# Patient Record
Sex: MALE | Race: WHITE | NOT HISPANIC OR LATINO | Employment: FULL TIME | URBAN - METROPOLITAN AREA
[De-identification: names, ages, dates, MRNs, and addresses within clinical notes are randomized per-mention and may not be internally consistent; named-entity substitution may affect disease eponyms.]

---

## 2018-09-09 ENCOUNTER — HOSPITAL ENCOUNTER (OUTPATIENT)
Facility: HOSPITAL | Age: 33
Discharge: LEFT BEFORE DISCHARGE | End: 2018-09-10
Attending: EMERGENCY MEDICINE | Admitting: HOSPITALIST

## 2018-09-09 DIAGNOSIS — F11.10 OPIATE ABUSE, EPISODIC: Primary | ICD-10-CM

## 2018-09-09 DIAGNOSIS — K52.9 COLITIS: ICD-10-CM

## 2018-09-09 PROBLEM — F19.10 POLYSUBSTANCE ABUSE: Status: ACTIVE | Noted: 2018-09-09

## 2018-09-09 PROBLEM — R10.9 ABDOMINAL PAIN: Status: ACTIVE | Noted: 2018-09-09

## 2018-09-09 LAB
ALBUMIN SERPL BCP-MCNC: 3.8 G/DL
ALP SERPL-CCNC: 50 U/L
ALT SERPL W/O P-5'-P-CCNC: 19 U/L
ANION GAP SERPL CALC-SCNC: 7 MMOL/L
AST SERPL-CCNC: 27 U/L
BASOPHILS # BLD AUTO: 0.02 K/UL
BASOPHILS NFR BLD: 0.1 %
BILIRUB SERPL-MCNC: 1 MG/DL
BILIRUB UR QL STRIP: NEGATIVE
BUN SERPL-MCNC: 15 MG/DL
CALCIUM SERPL-MCNC: 8.7 MG/DL
CHLORIDE SERPL-SCNC: 109 MMOL/L
CLARITY UR REFRACT.AUTO: CLEAR
CO2 SERPL-SCNC: 22 MMOL/L
COLOR UR AUTO: YELLOW
CREAT SERPL-MCNC: 0.8 MG/DL
CRP SERPL-MCNC: 0.5 MG/L
DIFFERENTIAL METHOD: ABNORMAL
EOSINOPHIL # BLD AUTO: 0.1 K/UL
EOSINOPHIL NFR BLD: 0.7 %
ERYTHROCYTE [DISTWIDTH] IN BLOOD BY AUTOMATED COUNT: 12.6 %
ERYTHROCYTE [SEDIMENTATION RATE] IN BLOOD BY WESTERGREN METHOD: <2 MM/HR
EST. GFR  (AFRICAN AMERICAN): >60 ML/MIN/1.73 M^2
EST. GFR  (NON AFRICAN AMERICAN): >60 ML/MIN/1.73 M^2
GLUCOSE SERPL-MCNC: 90 MG/DL
GLUCOSE UR QL STRIP: NEGATIVE
HCT VFR BLD AUTO: 39.3 %
HGB BLD-MCNC: 13.8 G/DL
HGB UR QL STRIP: NEGATIVE
IMM GRANULOCYTES # BLD AUTO: 0.04 K/UL
IMM GRANULOCYTES NFR BLD AUTO: 0.3 %
KETONES UR QL STRIP: ABNORMAL
LACTATE SERPL-SCNC: 1 MMOL/L
LEUKOCYTE ESTERASE UR QL STRIP: NEGATIVE
LYMPHOCYTES # BLD AUTO: 2.1 K/UL
LYMPHOCYTES NFR BLD: 15.1 %
MCH RBC QN AUTO: 32.3 PG
MCHC RBC AUTO-ENTMCNC: 35.1 G/DL
MCV RBC AUTO: 92 FL
MONOCYTES # BLD AUTO: 0.8 K/UL
MONOCYTES NFR BLD: 5.6 %
NEUTROPHILS # BLD AUTO: 10.6 K/UL
NEUTROPHILS NFR BLD: 78.2 %
NITRITE UR QL STRIP: NEGATIVE
NRBC BLD-RTO: 0 /100 WBC
PH UR STRIP: 7 [PH] (ref 5–8)
PLATELET # BLD AUTO: 240 K/UL
PMV BLD AUTO: 10.3 FL
POTASSIUM SERPL-SCNC: 3.7 MMOL/L
PROT SERPL-MCNC: 6.4 G/DL
PROT UR QL STRIP: NEGATIVE
RBC # BLD AUTO: 4.27 M/UL
SODIUM SERPL-SCNC: 138 MMOL/L
SP GR UR STRIP: 1.01 (ref 1–1.03)
URN SPEC COLLECT METH UR: ABNORMAL
UROBILINOGEN UR STRIP-ACNC: 2 EU/DL
WBC # BLD AUTO: 13.54 K/UL

## 2018-09-09 PROCEDURE — 85652 RBC SED RATE AUTOMATED: CPT

## 2018-09-09 PROCEDURE — 96365 THER/PROPH/DIAG IV INF INIT: CPT

## 2018-09-09 PROCEDURE — 25000003 PHARM REV CODE 250: Performed by: EMERGENCY MEDICINE

## 2018-09-09 PROCEDURE — 85025 COMPLETE CBC W/AUTO DIFF WBC: CPT | Mod: 91

## 2018-09-09 PROCEDURE — 99285 EMERGENCY DEPT VISIT HI MDM: CPT | Mod: 25

## 2018-09-09 PROCEDURE — G0378 HOSPITAL OBSERVATION PER HR: HCPCS

## 2018-09-09 PROCEDURE — 83605 ASSAY OF LACTIC ACID: CPT

## 2018-09-09 PROCEDURE — 81003 URINALYSIS AUTO W/O SCOPE: CPT

## 2018-09-09 PROCEDURE — S0030 INJECTION, METRONIDAZOLE: HCPCS | Performed by: EMERGENCY MEDICINE

## 2018-09-09 PROCEDURE — 80053 COMPREHEN METABOLIC PANEL: CPT | Mod: 91

## 2018-09-09 PROCEDURE — 87040 BLOOD CULTURE FOR BACTERIA: CPT | Mod: 59

## 2018-09-09 PROCEDURE — 63600175 PHARM REV CODE 636 W HCPCS: Performed by: EMERGENCY MEDICINE

## 2018-09-09 PROCEDURE — 96375 TX/PRO/DX INJ NEW DRUG ADDON: CPT

## 2018-09-09 PROCEDURE — 99284 EMERGENCY DEPT VISIT MOD MDM: CPT | Mod: ,,, | Performed by: EMERGENCY MEDICINE

## 2018-09-09 PROCEDURE — 96372 THER/PROPH/DIAG INJ SC/IM: CPT

## 2018-09-09 PROCEDURE — 63600175 PHARM REV CODE 636 W HCPCS: Performed by: HOSPITALIST

## 2018-09-09 PROCEDURE — 99219 PR INITIAL OBSERVATION CARE,LEVL II: CPT | Mod: ,,, | Performed by: HOSPITALIST

## 2018-09-09 PROCEDURE — 86140 C-REACTIVE PROTEIN: CPT

## 2018-09-09 PROCEDURE — 25000003 PHARM REV CODE 250: Performed by: HOSPITALIST

## 2018-09-09 PROCEDURE — 96361 HYDRATE IV INFUSION ADD-ON: CPT

## 2018-09-09 RX ORDER — IBUPROFEN 200 MG
16 TABLET ORAL
Status: DISCONTINUED | OUTPATIENT
Start: 2018-09-09 | End: 2018-09-10 | Stop reason: HOSPADM

## 2018-09-09 RX ORDER — IBUPROFEN 200 MG
24 TABLET ORAL
Status: DISCONTINUED | OUTPATIENT
Start: 2018-09-09 | End: 2018-09-10 | Stop reason: HOSPADM

## 2018-09-09 RX ORDER — KETOROLAC TROMETHAMINE 30 MG/ML
10 INJECTION, SOLUTION INTRAMUSCULAR; INTRAVENOUS
Status: COMPLETED | OUTPATIENT
Start: 2018-09-09 | End: 2018-09-09

## 2018-09-09 RX ORDER — GLUCAGON 1 MG
1 KIT INJECTION
Status: DISCONTINUED | OUTPATIENT
Start: 2018-09-09 | End: 2018-09-10 | Stop reason: HOSPADM

## 2018-09-09 RX ORDER — CIPROFLOXACIN 500 MG/1
500 TABLET ORAL EVERY 12 HOURS
Status: DISCONTINUED | OUTPATIENT
Start: 2018-09-10 | End: 2018-09-09

## 2018-09-09 RX ORDER — ONDANSETRON 8 MG/1
8 TABLET, ORALLY DISINTEGRATING ORAL EVERY 8 HOURS PRN
Status: DISCONTINUED | OUTPATIENT
Start: 2018-09-09 | End: 2018-09-10 | Stop reason: HOSPADM

## 2018-09-09 RX ORDER — SODIUM CHLORIDE 0.9 % (FLUSH) 0.9 %
5 SYRINGE (ML) INJECTION
Status: DISCONTINUED | OUTPATIENT
Start: 2018-09-09 | End: 2018-09-10 | Stop reason: HOSPADM

## 2018-09-09 RX ORDER — CIPROFLOXACIN 2 MG/ML
400 INJECTION, SOLUTION INTRAVENOUS
Status: DISCONTINUED | OUTPATIENT
Start: 2018-09-09 | End: 2018-09-10 | Stop reason: HOSPADM

## 2018-09-09 RX ORDER — PROCHLORPERAZINE EDISYLATE 5 MG/ML
10 INJECTION INTRAMUSCULAR; INTRAVENOUS
Status: COMPLETED | OUTPATIENT
Start: 2018-09-09 | End: 2018-09-09

## 2018-09-09 RX ORDER — AMOXICILLIN 250 MG
1 CAPSULE ORAL 2 TIMES DAILY PRN
Status: DISCONTINUED | OUTPATIENT
Start: 2018-09-09 | End: 2018-09-10 | Stop reason: HOSPADM

## 2018-09-09 RX ORDER — RAMELTEON 8 MG/1
8 TABLET ORAL NIGHTLY PRN
Status: DISCONTINUED | OUTPATIENT
Start: 2018-09-09 | End: 2018-09-10 | Stop reason: HOSPADM

## 2018-09-09 RX ORDER — HYOSCYAMINE SULFATE 0.12 MG/1
0.12 TABLET SUBLINGUAL 3 TIMES DAILY
Status: DISCONTINUED | OUTPATIENT
Start: 2018-09-09 | End: 2018-09-10 | Stop reason: HOSPADM

## 2018-09-09 RX ORDER — METRONIDAZOLE 500 MG/100ML
500 INJECTION, SOLUTION INTRAVENOUS
Status: DISCONTINUED | OUTPATIENT
Start: 2018-09-10 | End: 2018-09-10 | Stop reason: HOSPADM

## 2018-09-09 RX ORDER — DICYCLOMINE HYDROCHLORIDE 10 MG/ML
20 INJECTION INTRAMUSCULAR
Status: COMPLETED | OUTPATIENT
Start: 2018-09-09 | End: 2018-09-09

## 2018-09-09 RX ORDER — SODIUM CHLORIDE 9 MG/ML
INJECTION, SOLUTION INTRAVENOUS CONTINUOUS
Status: ACTIVE | OUTPATIENT
Start: 2018-09-09 | End: 2018-09-10

## 2018-09-09 RX ORDER — ACETAMINOPHEN 325 MG/1
650 TABLET ORAL EVERY 8 HOURS PRN
Status: DISCONTINUED | OUTPATIENT
Start: 2018-09-09 | End: 2018-09-10 | Stop reason: HOSPADM

## 2018-09-09 RX ORDER — METRONIDAZOLE 500 MG/100ML
500 INJECTION, SOLUTION INTRAVENOUS
Status: COMPLETED | OUTPATIENT
Start: 2018-09-09 | End: 2018-09-09

## 2018-09-09 RX ADMIN — HYOSCYAMINE SULFATE 0.12 MG: 0.12 TABLET ORAL; SUBLINGUAL at 05:09

## 2018-09-09 RX ADMIN — CIPROFLOXACIN 400 MG: 2 INJECTION, SOLUTION INTRAVENOUS at 06:09

## 2018-09-09 RX ADMIN — ACETAMINOPHEN 650 MG: 325 TABLET, FILM COATED ORAL at 08:09

## 2018-09-09 RX ADMIN — SODIUM CHLORIDE 1000 ML: 0.9 INJECTION, SOLUTION INTRAVENOUS at 06:09

## 2018-09-09 RX ADMIN — KETOROLAC TROMETHAMINE 10 MG: 30 INJECTION, SOLUTION INTRAMUSCULAR at 04:09

## 2018-09-09 RX ADMIN — PROCHLORPERAZINE EDISYLATE 10 MG: 5 INJECTION INTRAMUSCULAR; INTRAVENOUS at 04:09

## 2018-09-09 RX ADMIN — DICYCLOMINE HYDROCHLORIDE 20 MG: 20 INJECTION, SOLUTION INTRAMUSCULAR at 04:09

## 2018-09-09 RX ADMIN — SODIUM CHLORIDE 1000 ML: 0.9 INJECTION, SOLUTION INTRAVENOUS at 05:09

## 2018-09-09 RX ADMIN — HYOSCYAMINE SULFATE 0.12 MG: 0.12 TABLET ORAL; SUBLINGUAL at 08:09

## 2018-09-09 RX ADMIN — METRONIDAZOLE 500 MG: 500 INJECTION, SOLUTION INTRAVENOUS at 05:09

## 2018-09-09 NOTE — ED NOTES
LOC: The patient is awake, alert, and oriented to place, time, situation.  Speech is clear.    APPEARANCE: Patient resting on exam table, pt appears very uncomfortably, in  acute distress.  Patient is clean and well groomed.    SKIN: The skin is warm and dry; color consistent with ethnicity.  Patient has normal skin turgor and dry  mucus membranes.  Skin intact; no breakdown or bruising noted. Tattooed.    MUSCULOSKELETAL: Patient moving upper and lower extremities without difficulty.  Denies weakness.     RESPIRATORY: Airway is open and patent. Respirations spontaneous, even, easy, and non-labored.  Patient has a normal effort and rate.  No accessory muscle use noted. Denies cough.     CARDIAC:No peripheral edema noted. No complaints of chest pain.      ABDOMEN: firm and  tender to palpation. Pt reporting he feels like he has razor blades in his stomach. Last BM this am.    NEUROLOGIC: Eyes open spontaneously.  Follows commands; facial expression symmetrical.  Purposeful motor response noted; normal sensation in all extremities.

## 2018-09-09 NOTE — H&P
"[facility] Hospital Medicine  History & Physical      Patient Name: Delano Santana  MRN:  [id number]  Date of Admission:  9/9/2018  Attending Physician:  Marge Waldron MD  Primary Care Physician: [facility] Medicine Team: [facility] HOSP MED E Shawnee Escobar MD    Patient information was obtained from patient and ER records.    Subjective:     Principle Problem:  Colitis    Chief Complaint:    Chief Complaint   Patient presents with    Abdominal Pain     was seen at [place] this morning and was told to come to [facility] main for a GI consult; n/v       HPI:  Mr. Delano Santana is a 33 y.o. male with polysubstance abuse who presents to the ED from [place] ED because he was told that he needed GI evaluation for his abdominal pain.  He mentions that for the last 2-3 days, he has been having crampy and "razor blade" like RLQ abdominal pain.  He went to the ED on 9/7 where CT scan was performed which was negative for acute process.  He was discharged home with Bentyl and Phenergan.  He returned to the ED in [place] today because his pain persisted.  He endorsed not being able to take any PO medications because of nausea, vomiting, and abdominal pain.  CT scan was again performed which showed new colitis and a WBC 14.  He was given Cipro/Flagyl and was was sent to [facility] for GI evaluation.  He denies any use of if any drugs, except that his mother gave him a pain medication to help his pain.  He denies any diarrhea, constipation, fever, or chills.    In the ED, he was given IVF and Cipro and Flagyl.  He was informed that he won't be receiving any narcotics while inpatient.  He was admitted to Hospital Medicine for further management.      Past Medical History:   Diagnosis Date    Colitis     GI bleed        History reviewed. No pertinent surgical history.    No family history on file.    Social History     Socioeconomic History    Marital status: Single     Spouse name: None "    Number of children: None    Years of education: None    Highest education level: None   Social Needs    Financial resource strain: None    Food insecurity - worry: None    Food insecurity - inability: None    Transportation needs - medical: None    Transportation needs - non-medical: None   Occupational History    None   Tobacco Use    Smoking status: Heavy Tobacco Smoker     Packs/day: 0.50     Types: Cigarettes    Smokeless tobacco: Never Used   Substance and Sexual Activity    Alcohol use: No    Drug use: Yes     Types: Marijuana    Sexual activity: None   Other Topics Concern    None   Social History Narrative    None       Current Facility-Administered Medications on File Prior to Encounter   Medication Dose Route Frequency Provider Last Rate Last Dose    [COMPLETED] 0.9%  NaCl infusion  1,000 mL Intravenous ED 1 Time Anthony Duffy  mL/hr at 09/09/18 0557 1,000 mL at 09/09/18 0557    [COMPLETED] ciprofloxacin HCl tablet 500 mg  500 mg Oral ED 1 Time Ja Amador MD   500 mg at 09/09/18 0724    [COMPLETED] dicyclomine injection 20 mg  20 mg Intramuscular ED 1 Time Anthony Duffy MD   20 mg at 09/09/18 0557    [COMPLETED] GI cocktail (mylanta 30 mL, lidocaine 2 % viscous 10 mL, dicyclomine 10 mL) 50 mL   Oral ED 1 Time Ja Amador MD   50 mL at 09/09/18 0701    [COMPLETED] hydromorphone (PF) injection 1 mg  1 mg Intravenous ED 1 Time Ja Amador MD   1 mg at 09/09/18 0722    [COMPLETED] metronidazole IVPB 500 mg  500 mg Intravenous ED 1 Time Anthony Duffy MD   Stopped at 09/09/18 0701    [COMPLETED] prochlorperazine injection Soln 10 mg  10 mg Intravenous Once Ja Amador MD   10 mg at 09/09/18 0720    [DISCONTINUED] metroNIDAZOLE tablet 500 mg  500 mg Oral ED 1 Time Ja Amador MD        [DISCONTINUED] prochlorperazine injection Soln 10 mg  10 mg Intravenous Once Ja Amador MD         Current Outpatient Medications on  File Prior to Encounter   Medication Sig Dispense Refill    dicyclomine (BENTYL) 20 mg tablet Take 1 tablet (20 mg total) by mouth 3 (three) times daily as needed (abdominal cramps). 20 tablet 0    metroNIDAZOLE (FLAGYL) 500 MG tablet Take 1 tablet (500 mg total) by mouth 3 (three) times daily. for 7 days 21 tablet 0    promethazine (PHENERGAN) 25 MG tablet Take 1 tablet (25 mg total) by mouth every 6 (six) hours as needed for Nausea. 15 tablet 0    ciprofloxacin HCl (CIPRO) 500 MG tablet Take 1 tablet (500 mg total) by mouth 2 (two) times daily. for 7 days 14 tablet 0    UNKNOWN TO PATIENT       [DISCONTINUED] dicyclomine (BENTYL) 20 mg tablet Take 1 tablet (20 mg total) by mouth 2 (two) times daily. 20 tablet 0        Review of patient's allergies indicates:  No Known Allergies    Review Of Systems:  Constitutional: Negative for chills, fatigue, fever.   HENT: Negative for sore throat, trouble swallowing.    Eyes: Negative for photophobia, visual disturbance.   Respiratory: Negative for cough, shortness of breath.    Cardiovascular: Negative for chest pain, palpitations, leg swelling.   Gastrointestinal: Negative for abdominal pain, constipation, diarrhea, nausea, vomiting.   Endocrine: Negative for cold intolerance, heat intolerance.   Genitourinary: Negative for dysuria, frequency.   Musculoskeletal: Negative for arthralgias, myalgias.   Skin: Negative for rash, wound, erythema   Neurological: Negative for dizziness, syncope, weakness, light-headedness.   Psychiatric/Behavioral: Negative for confusion, hallucinations, anxiety  All other systems reviewed and are negative.      Objective:     Temp:  [97.7 °F (36.5 °C)-98.3 °F (36.8 °C)]   Pulse:  [47-76]   Resp:  [18-20]   BP: (116-144)/(79-97)   SpO2:  [98 %-99 %]   Body mass index is 22.71 kg/m².    Physical Exam:  Constitutional: Appears well-developed and well-nourished. Appears slightly uncomfortable.  Head: Normocephalic and atraumatic.   Mouth/Throat:  Oropharynx is clear and moist.   Eyes: EOM are normal. Pupils are equal, round, and reactive to light. No scleral icterus.   Neck: Normal range of motion. Neck supple.   Cardiovascular: Normal heart rate.  Regular heart rhythm.  No murmur heard.  Pulmonary/Chest: Effort normal and breath sounds normal. No respiratory distress. No wheezes, rales, or rhonchi  Abdominal: Soft. Bowel sounds are normal.  No distension.  No tenderness.  Voluntary guarding  Musculoskeletal: Normal range of motion. No edema.   Neurological: Alert and oriented to person, place, and time.   Skin: Skin is warm and dry. Multiple tattoos  Psychiatric: Normal mood and affect. Behavior is normal.     No intake or output data in the 24 hours ending 09/09/18 1718  Lab Results   Component Value Date    WBC 13.54 (H) 09/09/2018    HGB 13.8 (L) 09/09/2018    HCT 39.3 (L) 09/09/2018    MCV 92 09/09/2018     09/09/2018     Recent Labs   Lab  09/09/18   1648   GLU  90   NA  138   K  3.7   CL  109   CO2  22*   BUN  15   CREATININE  0.8   CALCIUM  8.7     No results found for: INR, PROTIME  No results found for: HGBA1C  No results for input(s): POCTGLUCOSE in the last 72 hours.  No results for input(s): LACTATE in the last 72 hours.       Assessment/Plan:     Mr. Delano Santana is a 33 y.o. male who presented to [facility] on 9/9/2018 with colitis.    Colitis  Abdominal Pain  · WBC 14 with a left shift  · CT scan 9/9 with colitis  · Start IV Cipro/Flagyl (start date 9/9)  · Continue IVF  · Levsin TID for abdominal pain and Zofran prn nausea/vomiting    Polysubstance Abuse  Opiate Abuse  · UDS positive for THC, Oxy  · Continue IVF and Levsin for pain  · Informed patient he won't be receiving narcotics while inpatient       Diet:  Clear liquid  GI PPx:  Not indicated  DVT PPx:  Ambulatory  Goals of Care:  Full      Disposition:  Home [place] pending toleration of diet and abdominal pain  Discharge Needs:  TBD      Shawnee Escobar MD  [facility]  Medicine  Cell:  [phone]  Spectra:  [phone]  Pager:  [phone]

## 2018-09-09 NOTE — ED TRIAGE NOTES
Comes to ED, EMS transfer fron [facility] for c/o severe abdominal pain.  Pain is described as razor blades, constant, since Friday.

## 2018-09-09 NOTE — ED PROVIDER NOTES
"Encounter Date: 9/9/2018    SCRIBE #1 NOTE: I, Jeanna De Leon, am scribing for, and in the presence of,  Dr. Wilkinson. I have scribed the following portions of the note - Other sections scribed: HPI, ROS, PE.       History     Chief Complaint   Patient presents with    Abdominal Pain     was seen at [place] this morning and was told to come to [facility] for a GI consult; n/v     Time patient was seen by the provider: 4:04 PM      The patient is a 33 y.o. male with medical history of GI bleed, colitis and positive for opioid use in the past who presents to the ED with a complaint of severe abdominal pain with onset 2 days ago. Patient was seen at [place] this morning and transferred here per EMS for a GI consult. He describes pain as, "razor blades." He reports he smokes tobacca and maijuana socially. Patient evasive regarding opioid use when confronted. He states his mother gave him pills for pain.       The history is provided by the patient and medical records.     Review of patient's allergies indicates:  No Known Allergies  Past Medical History:   Diagnosis Date    Colitis     GI bleed      History reviewed. No pertinent surgical history.  No family history on file.  Social History     Tobacco Use    Smoking status: Heavy Tobacco Smoker     Packs/day: 0.50     Types: Cigarettes    Smokeless tobacco: Never Used   Substance Use Topics    Alcohol use: No    Drug use: Yes     Types: Marijuana     Review of Systems   Gastrointestinal: Positive for abdominal pain.   All other systems reviewed and are negative.      Physical Exam     Initial Vitals [09/09/18 1534]   BP Pulse Resp Temp SpO2   116/79 60 18 97.7 °F (36.5 °C) 99 %      MAP       --         Physical Exam    Nursing note and vitals reviewed.  Constitutional: He appears well-developed and well-nourished.   Uncomfortable   HENT:   Head: Normocephalic and atraumatic.   Mouth/Throat: Oropharynx is clear and moist and mucous membranes are normal. "   Eyes: Conjunctivae are normal.   Neck: Normal range of motion. Neck supple.   Cardiovascular: Normal rate, regular rhythm and normal heart sounds.   Pulmonary/Chest: Breath sounds normal. No respiratory distress.   Abdominal: Soft. Bowel sounds are normal. He exhibits no distension. There is no tenderness. There is guarding (voluntary).   Musculoskeletal: Normal range of motion. He exhibits no edema.   Neurological: He is alert and oriented to person, place, and time. He has normal strength. No sensory deficit.   Skin: Skin is warm and dry. Capillary refill takes less than 2 seconds.   Psychiatric: He has a normal mood and affect.         ED Course   Procedures  Labs Reviewed - No data to display       Imaging Results    None          Medical Decision Making:   History:   Old Medical Records: I decided to obtain old medical records.  ED Management:  Will place in observation. Concern with evasive hx re opiates in UDS. ? Withdrawal, ? Worsening colitis. Will obs and offer ivf and abx.            Scribe Attestation:   Scribe #1: I performed the above scribed service and the documentation accurately describes the services I performed. I attest to the accuracy of the note.               Clinical Impression:   There were no encounter diagnoses.                             Nura Wilkinson MD  09/09/18 9934

## 2018-09-10 VITALS
OXYGEN SATURATION: 100 % | DIASTOLIC BLOOD PRESSURE: 77 MMHG | WEIGHT: 142 LBS | SYSTOLIC BLOOD PRESSURE: 126 MMHG | TEMPERATURE: 97 F | BODY MASS INDEX: 22.24 KG/M2 | HEART RATE: 53 BPM | RESPIRATION RATE: 16 BRPM

## 2018-09-10 LAB
ALBUMIN SERPL BCP-MCNC: 3.8 G/DL
ALP SERPL-CCNC: 52 U/L
ALT SERPL W/O P-5'-P-CCNC: 21 U/L
ANION GAP SERPL CALC-SCNC: 9 MMOL/L
AST SERPL-CCNC: 36 U/L
BASOPHILS # BLD AUTO: 0.05 K/UL
BASOPHILS NFR BLD: 0.4 %
BILIRUB SERPL-MCNC: 1.2 MG/DL
BUN SERPL-MCNC: 11 MG/DL
CALCIUM SERPL-MCNC: 8.5 MG/DL
CHLORIDE SERPL-SCNC: 108 MMOL/L
CO2 SERPL-SCNC: 20 MMOL/L
CREAT SERPL-MCNC: 0.8 MG/DL
DIFFERENTIAL METHOD: ABNORMAL
EOSINOPHIL # BLD AUTO: 0.1 K/UL
EOSINOPHIL NFR BLD: 1.1 %
ERYTHROCYTE [DISTWIDTH] IN BLOOD BY AUTOMATED COUNT: 12.6 %
EST. GFR  (AFRICAN AMERICAN): >60 ML/MIN/1.73 M^2
EST. GFR  (NON AFRICAN AMERICAN): >60 ML/MIN/1.73 M^2
GLUCOSE SERPL-MCNC: 100 MG/DL
HCT VFR BLD AUTO: 39.6 %
HGB BLD-MCNC: 13.5 G/DL
IMM GRANULOCYTES # BLD AUTO: 0.05 K/UL
IMM GRANULOCYTES NFR BLD AUTO: 0.4 %
LYMPHOCYTES # BLD AUTO: 2.1 K/UL
LYMPHOCYTES NFR BLD: 17.3 %
MAGNESIUM SERPL-MCNC: 1.7 MG/DL
MCH RBC QN AUTO: 31.8 PG
MCHC RBC AUTO-ENTMCNC: 34.1 G/DL
MCV RBC AUTO: 93 FL
MONOCYTES # BLD AUTO: 0.8 K/UL
MONOCYTES NFR BLD: 6.1 %
NEUTROPHILS # BLD AUTO: 9.1 K/UL
NEUTROPHILS NFR BLD: 74.7 %
NRBC BLD-RTO: 0 /100 WBC
PHOSPHATE SERPL-MCNC: 2.3 MG/DL
PLATELET # BLD AUTO: 233 K/UL
PMV BLD AUTO: 10.3 FL
POTASSIUM SERPL-SCNC: 3.7 MMOL/L
PROT SERPL-MCNC: 6 G/DL
RBC # BLD AUTO: 4.24 M/UL
SODIUM SERPL-SCNC: 137 MMOL/L
WBC # BLD AUTO: 12.2 K/UL

## 2018-09-10 PROCEDURE — 83735 ASSAY OF MAGNESIUM: CPT

## 2018-09-10 PROCEDURE — 85025 COMPLETE CBC W/AUTO DIFF WBC: CPT

## 2018-09-10 PROCEDURE — 25000003 PHARM REV CODE 250: Performed by: HOSPITALIST

## 2018-09-10 PROCEDURE — S0030 INJECTION, METRONIDAZOLE: HCPCS | Performed by: HOSPITALIST

## 2018-09-10 PROCEDURE — G0378 HOSPITAL OBSERVATION PER HR: HCPCS

## 2018-09-10 PROCEDURE — 84100 ASSAY OF PHOSPHORUS: CPT

## 2018-09-10 PROCEDURE — 63600175 PHARM REV CODE 636 W HCPCS: Performed by: NURSE PRACTITIONER

## 2018-09-10 PROCEDURE — 36415 COLL VENOUS BLD VENIPUNCTURE: CPT

## 2018-09-10 PROCEDURE — 80053 COMPREHEN METABOLIC PANEL: CPT

## 2018-09-10 PROCEDURE — 63600175 PHARM REV CODE 636 W HCPCS: Performed by: EMERGENCY MEDICINE

## 2018-09-10 PROCEDURE — 99226 PR SUBSEQUENT OBSERVATION CARE,LEVEL III: CPT | Mod: ,,, | Performed by: PHYSICIAN ASSISTANT

## 2018-09-10 RX ORDER — KETOROLAC TROMETHAMINE 15 MG/ML
10 INJECTION, SOLUTION INTRAMUSCULAR; INTRAVENOUS ONCE
Status: COMPLETED | OUTPATIENT
Start: 2018-09-10 | End: 2018-09-10

## 2018-09-10 RX ORDER — ACETAMINOPHEN 10 MG/ML
1000 INJECTION, SOLUTION INTRAVENOUS ONCE
Status: COMPLETED | OUTPATIENT
Start: 2018-09-10 | End: 2018-09-10

## 2018-09-10 RX ORDER — KETOROLAC TROMETHAMINE 15 MG/ML
15 INJECTION, SOLUTION INTRAMUSCULAR; INTRAVENOUS EVERY 6 HOURS PRN
Status: DISCONTINUED | OUTPATIENT
Start: 2018-09-10 | End: 2018-09-10 | Stop reason: HOSPADM

## 2018-09-10 RX ADMIN — ONDANSETRON 8 MG: 8 TABLET, ORALLY DISINTEGRATING ORAL at 02:09

## 2018-09-10 RX ADMIN — METRONIDAZOLE 500 MG: 500 INJECTION, SOLUTION INTRAVENOUS at 02:09

## 2018-09-10 RX ADMIN — CIPROFLOXACIN 400 MG: 2 INJECTION, SOLUTION INTRAVENOUS at 05:09

## 2018-09-10 RX ADMIN — KETOROLAC TROMETHAMINE 10.01 MG: 15 INJECTION, SOLUTION INTRAMUSCULAR; INTRAVENOUS at 05:09

## 2018-09-10 RX ADMIN — ACETAMINOPHEN 1000 MG: 10 INJECTION, SOLUTION INTRAVENOUS at 06:09

## 2018-09-10 NOTE — PROGRESS NOTES
Pt complaining of stabbing pain in abdomen. Tanika De Paz who ordered IV tylenol for pt. Pt is requesting Dilaudid and threatening to leave AMA if his pain is not controlled. Will continue with POC.

## 2018-09-10 NOTE — PROGRESS NOTES
Pt complaining of nausea and stabbing pain in abdomen. Spoke with YESSICA De Paz who ordered a one time dose of ketorolac.

## 2018-09-10 NOTE — DISCHARGE SUMMARY
"Discharge Summary  Hospital Medicine    Attending Provider on Discharge: Dio Ch PA-C  Hospital Medicine Team: [facility] HOSP MED E  Date of Admission:  9/9/2018     Date of Discharge:  9/10/2018    Active Hospital Problems    Diagnosis  POA    *Colitis [K52.9]  Yes    Polysubstance abuse [F19.10]  Yes    Opiate abuse, episodic [F11.10]  Yes    Abdominal pain [R10.9]  Yes      Resolved Hospital Problems   No resolved problems to display.       History of the Present Illness:       Mr. Delano Santana is a 33 y.o. male with polysubstance abuse who presents to the ED from [facility] because he was told that he needed GI evaluation for his abdominal pain.  He mentions that for the last 2-3 days, he has been having crampy and "razor blade" like RLQ abdominal pain.  He went to the ED on 9/7 where CT scan was performed which was negative for acute process.  He was discharged home with Bentyl and Phenergan.  He returned to the ED in [place] today because his pain persisted.  He endorsed not being able to take any PO medications because of nausea, vomiting, and abdominal pain.  CT scan was again performed which showed new colitis and a WBC 14.  He was given Cipro/Flagyl and was was sent to [facility] for GI evaluation.  He denies any use of if any drugs, except that his mother gave him a pain medication to help his pain.  He denies any diarrhea, constipation, fever, or chills.     In the ED, he was given IVF and Cipro and Flagyl.  He was informed that he won't be receiving any narcotics while inpatient.  He was admitted to Hospital Medicine for further management.      Hospital Course of Principle Problem Addressed:       Patient admitted to observation for colitis. Prior to primary team arrival, patient discharged AMA.     Other Medical Problems Addressed in the Hospital:     Mr. Delano Santana is a 33 y.o. male who presented to [facility] on 9/9/2018 with colitis.     Colitis  Abdominal Pain  · WBC 14 with a " left shift  · CT scan 9/9 with colitis  · Start IV Cipro/Flagyl (start date 9/9)  · Continue IVF  · Levsin TID for abdominal pain and Zofran prn nausea/vomiting  · Patient left AMA     Polysubstance Abuse  Opiate Abuse  · UDS positive for THC, Oxy  · Continue IVF and Levsin for pain  · Informed patient he won't be receiving narcotics while inpatient        Significant diagnostic tests     Recent Labs   Lab  09/07/18   0911  09/09/18   0552  09/09/18   1648  09/10/18   0525   WBC  11.60  14.60*  13.54*  12.20   HGB  14.4  14.9  13.8*  13.5*   HCT  42.5  44.2  39.3*  39.6*   PLT  267  279  240  233     Recent Labs   Lab  09/07/18   0911  09/09/18   0552  09/09/18   1648  09/10/18   0525   NA  138  139  138  137   K  3.8  3.6  3.7  3.7   CL  107  103  109  108   CO2  22*  26  22*  20*   BUN  15  24*  15  11   CREATININE  0.9  1.0  0.8  0.8   CALCIUM  9.2  9.1  8.7  8.5*   MG   --    --    --   1.7   PHOS   --    --    --   2.3*   PROT  7.5  7.8  6.4  6.0   BILITOT  0.9  1.1  1.0  1.2*   ALKPHOS  55  49  50*  52*   ALT  19  19  19  21   AST  22  22  27  36       Other diagnostic tests     Special Treatments/ Procedures:      none    Discharge Medications:        Discharge Medication List as of 9/10/2018  7:48 AM      CONTINUE these medications which have NOT CHANGED    Details   dicyclomine (BENTYL) 20 mg tablet Take 1 tablet (20 mg total) by mouth 3 (three) times daily as needed (abdominal cramps)., Starting Sun 9/9/2018, Until Tue 10/9/2018, Print      metroNIDAZOLE (FLAGYL) 500 MG tablet Take 1 tablet (500 mg total) by mouth 3 (three) times daily. for 7 days, Starting Sun 9/9/2018, Until Sun 9/16/2018, Print      promethazine (PHENERGAN) 25 MG tablet Take 1 tablet (25 mg total) by mouth every 6 (six) hours as needed for Nausea., Starting Fri 9/7/2018, Print      ciprofloxacin HCl (CIPRO) 500 MG tablet Take 1 tablet (500 mg total) by mouth 2 (two) times daily. for 7 days, Starting Sun 9/9/2018, Until Sun 9/16/2018,  Print      UNKNOWN TO PATIENT Historical Med             Discharge Diet:regular diet with Normal Fluid intake of 1500 - 2000 mL per day    Activity: activity as tolerated    Discharge Condition: Stable    Disposition: Left Against Medical Advice    Tests pending at the time of discharge: none      Time spent  on the discharge of the patient including review of hospital course with the patient. reviewing discharge medications and arranging follow-up care 36 minutes    Discharge examination Patient was seen and examined on the date of discharge and determined to be suitable for discharge.    No future appointments.    IDA MorrisseyC   [facility] Medicine

## 2018-09-10 NOTE — PLAN OF CARE
09/10/18 1232   Final Note   Assessment Type Final Discharge Note     Patient left AMA prior to this CM completing a discharge planning assessment.

## 2018-09-10 NOTE — CARE UPDATE
Patient stating he is not staying in the hospital and wants to leave against medical advice. Patient advised of possible complications if chooses to refuse treatment. Patient signed AMA and SL removed x2. Patient ambulated off floor. Md notified of AMA prior to advise of patient.

## 2018-09-14 LAB
BACTERIA BLD CULT: NORMAL
BACTERIA BLD CULT: NORMAL

## 2022-09-09 PROBLEM — G93.40 ACUTE ENCEPHALOPATHY: Status: ACTIVE | Noted: 2022-09-09
